# Patient Record
Sex: MALE | Race: WHITE | Employment: FULL TIME | URBAN - METROPOLITAN AREA
[De-identification: names, ages, dates, MRNs, and addresses within clinical notes are randomized per-mention and may not be internally consistent; named-entity substitution may affect disease eponyms.]

---

## 2017-07-13 PROCEDURE — 84425 ASSAY OF VITAMIN B-1: CPT | Performed by: FAMILY MEDICINE

## 2017-07-13 PROCEDURE — 82607 VITAMIN B-12: CPT | Performed by: FAMILY MEDICINE

## 2017-07-13 PROCEDURE — 83921 ORGANIC ACID SINGLE QUANT: CPT | Performed by: FAMILY MEDICINE

## 2017-07-13 PROCEDURE — 82746 ASSAY OF FOLIC ACID SERUM: CPT | Performed by: FAMILY MEDICINE

## 2017-07-13 PROCEDURE — 86618 LYME DISEASE ANTIBODY: CPT | Performed by: FAMILY MEDICINE

## 2017-07-13 PROCEDURE — 36415 COLL VENOUS BLD VENIPUNCTURE: CPT | Performed by: FAMILY MEDICINE

## 2019-01-16 PROBLEM — M25.511 CHRONIC RIGHT SHOULDER PAIN: Status: ACTIVE | Noted: 2019-01-16

## 2019-01-16 PROBLEM — G89.29 CHRONIC RIGHT SHOULDER PAIN: Status: ACTIVE | Noted: 2019-01-16

## 2019-01-16 PROBLEM — M54.12 RIGHT CERVICAL RADICULOPATHY: Status: ACTIVE | Noted: 2019-01-16

## 2019-09-25 PROBLEM — N48.6 PEYRONIE'S DISEASE: Status: ACTIVE | Noted: 2019-09-25

## 2019-09-25 PROBLEM — N40.1 ENLARGED PROSTATE WITH LOWER URINARY TRACT SYMPTOMS (LUTS): Status: ACTIVE | Noted: 2019-09-25

## 2021-07-19 PROBLEM — M25.551 RIGHT HIP PAIN: Status: ACTIVE | Noted: 2021-07-19

## 2021-12-27 ENCOUNTER — HOSPITAL ENCOUNTER (OUTPATIENT)
Dept: CT IMAGING | Facility: HOSPITAL | Age: 56
Discharge: HOME | End: 2021-12-27
Attending: FAMILY MEDICINE

## 2021-12-27 DIAGNOSIS — Z13.6 SCREENING FOR CARDIOVASCULAR CONDITION: ICD-10-CM

## 2022-02-07 ENCOUNTER — LAB ENCOUNTER (OUTPATIENT)
Dept: LAB | Facility: HOSPITAL | Age: 57
End: 2022-02-07
Attending: INTERNAL MEDICINE
Payer: COMMERCIAL

## 2022-02-07 DIAGNOSIS — Z20.822 ENCOUNTER FOR PREOPERATIVE SCREENING LABORATORY TESTING FOR COVID-19 VIRUS: ICD-10-CM

## 2022-02-07 DIAGNOSIS — Z01.812 ENCOUNTER FOR PREOPERATIVE SCREENING LABORATORY TESTING FOR COVID-19 VIRUS: ICD-10-CM

## 2022-02-07 LAB — SARS-COV-2 RNA RESP QL NAA+PROBE: NOT DETECTED

## 2022-02-07 NOTE — PROGRESS NOTES
You will be happy to know that your COVID 19 test returned NEGATIVE. If you need any further assistance, please feel free to call [phone]. Thank you for letting us care for you.     Best regards,   MD Victor Manuel Umanzor  Gastroenterology  [phone]

## 2022-02-28 ENCOUNTER — LAB ENCOUNTER (OUTPATIENT)
Dept: LAB | Facility: HOSPITAL | Age: 57
End: 2022-02-28
Attending: INTERNAL MEDICINE
Payer: COMMERCIAL

## 2022-02-28 DIAGNOSIS — Z20.822 ENCOUNTER FOR PREPROCEDURE SCREENING LABORATORY TESTING FOR COVID-19: ICD-10-CM

## 2022-02-28 DIAGNOSIS — Z01.812 ENCOUNTER FOR PREPROCEDURE SCREENING LABORATORY TESTING FOR COVID-19: ICD-10-CM

## 2022-03-01 LAB — SARS-COV-2 RNA RESP QL NAA+PROBE: NOT DETECTED

## 2022-03-01 NOTE — PROGRESS NOTES
You will be happy to know that your COVID 19 test returned NEGATIVE. If you need any further assistance, please feel free to call [phone]. Thank you for letting us care for you.     Best regards,   Vannesa Pratt MD  [facility]  Gastroenterology  [phone]

## 2022-03-03 ENCOUNTER — HOSPITAL ENCOUNTER (OUTPATIENT)
Facility: HOSPITAL | Age: 57
Setting detail: HOSPITAL OUTPATIENT SURGERY
Discharge: HOME | End: 2022-03-03
Attending: INTERNAL MEDICINE | Admitting: INTERNAL MEDICINE
Payer: COMMERCIAL

## 2022-03-03 VITALS
TEMPERATURE: 98 F | HEIGHT: 70 IN | RESPIRATION RATE: 18 BRPM | BODY MASS INDEX: 27.2 KG/M2 | DIASTOLIC BLOOD PRESSURE: 75 MMHG | HEART RATE: 73 BPM | SYSTOLIC BLOOD PRESSURE: 120 MMHG | OXYGEN SATURATION: 97 % | WEIGHT: 190 LBS

## 2022-03-03 DIAGNOSIS — Z20.822 ENCOUNTER FOR PREPROCEDURE SCREENING LABORATORY TESTING FOR COVID-19: Primary | ICD-10-CM

## 2022-03-03 DIAGNOSIS — R13.19 ESOPHAGEAL DYSPHAGIA: ICD-10-CM

## 2022-03-03 DIAGNOSIS — Z01.812 ENCOUNTER FOR PREPROCEDURE SCREENING LABORATORY TESTING FOR COVID-19: Primary | ICD-10-CM

## 2022-03-03 PROCEDURE — 4A1B7BZ MONITORING OF GASTROINTESTINAL PRESSURE, VIA NATURAL OR ARTIFICIAL OPENING: ICD-10-PCS | Performed by: INTERNAL MEDICINE

## 2025-03-25 ENCOUNTER — OFFICE VISIT (OUTPATIENT)
Dept: NEUROLOGY | Facility: CLINIC | Age: 60
End: 2025-03-25
Payer: COMMERCIAL

## 2025-03-25 VITALS
RESPIRATION RATE: 15 BRPM | WEIGHT: 204 LBS | SYSTOLIC BLOOD PRESSURE: 117 MMHG | DIASTOLIC BLOOD PRESSURE: 68 MMHG | HEART RATE: 73 BPM | BODY MASS INDEX: 29 KG/M2

## 2025-03-25 DIAGNOSIS — G37.9 DEMYELINATING CHANGES IN BRAIN (HCC): Primary | ICD-10-CM

## 2025-03-25 DIAGNOSIS — G56.01 CARPAL TUNNEL SYNDROME OF RIGHT WRIST: ICD-10-CM

## 2025-03-25 DIAGNOSIS — R29.898 WEAKNESS OF RIGHT LOWER EXTREMITY: ICD-10-CM

## 2025-03-25 RX ORDER — IBUPROFEN 400 MG/1
400 TABLET, FILM COATED ORAL DAILY
COMMUNITY

## 2025-03-25 RX ORDER — ASCORBIC ACID 500 MG
1000 TABLET ORAL DAILY
COMMUNITY

## 2025-03-25 RX ORDER — MECLIZINE HYDROCHLORIDE 25 MG/1
TABLET ORAL
COMMUNITY
Start: 2024-09-28

## 2025-03-25 RX ORDER — LORAZEPAM 1 MG/1
TABLET ORAL
Qty: 2 TABLET | Refills: 0 | Status: SHIPPED | OUTPATIENT
Start: 2025-03-25

## 2025-03-25 NOTE — H&P
Neurology H&P    Odell aCldwell Patient Status:  No patient class for patient encounter    1965 MRN [id number]   Location [facility], [address] [place] [address] Attending No att. providers found   Hosp Day # 0 PCP Sindi Samuel MD     Subjective:  Odell Caldwell is a(n) 60 year old male with a past medical history significant for BPH, chronic right shoulder pain, and a recent diagnosis of multiple sclerosis.  He is previously under the care of [facility] neurology.  He was diagnosed with also sclerosis at the end of last year by MRI findings.  He does not have any lumbar puncture.  He states that he has been having increasing numbness in the hand on the R. He lifts weights and feels that his R  has been getting weaker. He states that he is also having a difficulty time walking up stairs due to RLE weakness. He also states that he has numbness in the RLE which radiates down the RLE form the hip He saw Dr. Lopez in neurology and was given a diagnosis of MS. He states that he had a plan to start ocrevus and she did order multiple labs and immunizations. He also had an EMG and was diagnosed with R CTS and then had CTS release surgery. He states that he is not aware of ever having any \"MS attacks\". His biggest problems is a feeling of weakness int he RLE and weakness in the R deltoid. He has a h/o R shoulder surgery. He has no new urinary issues but does have BPH so this effects his stream a little bit. He denies any painful eye movements and no color vision changes. He has no FH of MS. is any radiating neck pains.  He denies any radiating low back pains.  He denies any fatigable weakness.  He states that he has not had any electrical sensations running up or down the spine suggestive of Lhermitte sign and does not notice any worsening of his symptoms in hot conditions.  He does report that he is had numbness exam for couple years and was diagnosed carpal tunnel.  His symptoms do not  seem to be appreciably worsening over the past few years.    Current Medications:  Current Outpatient Medications   Medication Sig Dispense Refill    fluticasone propionate 50 MCG/ACT Nasal Suspension 2 sprays by Each Nare route daily. 48 g 3    benzonatate 200 MG Oral Cap Take 1 capsule (200 mg total) by mouth 3 (three) times daily as needed for cough. 90 capsule 1    benzonatate 200 MG Oral Cap Take 1 capsule (200 mg total) by mouth 3 (three) times daily as needed for cough. 30 capsule 0    montelukast 10 MG Oral Tab Take 1 tablet (10 mg total) by mouth nightly. 90 tablet 3    Tadalafil 10 MG Oral Tab Take 1 tablet (10 mg total) by mouth daily as needed for Erectile Dysfunction. 30 tablet 5    Tadalafil 10 MG Oral Tab Take 1 tablet (10 mg total) by mouth daily as needed for Erectile Dysfunction. 30 tablet 3    tadalafil 5 MG Oral Tab Take 1 tablet (5 mg total) by mouth daily as needed for Erectile Dysfunction. 30 tablet 5    Alfuzosin HCl ER 10 MG Oral Tablet 24 Hr Take 1 tablet (10 mg total) by mouth daily. 90 tablet 3    Meloxicam 15 MG Oral Tab Take 1 tablet (15 mg total) by mouth daily. 30 tablet 1    tadalafil 5 MG Oral Tab Take 1 tablet (5 mg total) by mouth daily as needed for Erectile Dysfunction. 30 tablet 0    Pantoprazole Sodium 40 MG Oral Tab EC Take 1 tablet (40 mg total) by mouth daily. Before meal 90 tablet 5    Melatonin 10 MG Oral Cap Take 1 capsule by mouth nightly.      loratadine 10 MG Oral Tab       Multiple Vitamins-Minerals (MULTI VITAMIN/MINERALS) Oral Tab Take by mouth.         Problem List:  Patient Active Problem List   Diagnosis    BPH (benign prostatic hyperplasia)    Chronic right shoulder pain    Right cervical radiculopathy    Enlarged prostate with lower urinary tract symptoms (LUTS)    Peyronie's disease    Right hip pain       PMHx:  Past Medical History:    BPH    OTHER DISEASES    Gall bladder removed    Viral hepatitis C    tested pos but retested \"not likely\"        PSHx:  Past Surgical History:   Procedure Laterality Date    Colonoscopy      Colonoscopy,biopsy N/A 2016    Procedure: COLONOSCOPY, POSSIBLE BIOPSY, POSSIBLE POLYPECTOMY 99541;  Surgeon: ePrcy Flores MD;  Location: [facility]    Colonoscopy,remv lesn,snare N/A 2016    Procedure: COLONOSCOPY, POSSIBLE BIOPSY, POSSIBLE POLYPECTOMY 92175;  Surgeon: Percy Flores MD;  Location: [facility]    Knee surgery  1990    torn ACL.  never repaired    Laparoscopic cholecystectomy  2014    Procedure: LAPAROSCOPIC CHOLECYSTECTOMY;  Surgeon: Julio Cesar Rayo MD;  Location:  MAIN OR    Oral surgery procedure      Fithian Teeth Extraction-local    Other surgical history  2020    Cystoscopy (Dr. Leary)       SocHx:  Social History     Socioeconomic History    Marital status:    Tobacco Use    Smoking status: Never    Smokeless tobacco: Never   Substance and Sexual Activity    Alcohol use: Yes     Alcohol/week: 0.0 standard drinks of alcohol     Types: 2 Cans of beer, 2 Shots of liquor per week     Comment: Rarely    Drug use: No       Family History:  Family History   Problem Relation Age of Onset    Cancer Father         myeloma    Cancer Mother         Renal Cancer    Circulatory Problems Mother         Buerger's disease    Kidney Disease Mother         Kidney removed to renal cancer    Diabetes Paternal Grandmother          just after childbirth of my father           ROS:  10 point ROS completed and was negative, except for pertinent positive and negatives stated in subjective.    Objective/Physical Exam:    Vital Signs:  There were no vitals taken for this visit.    Gen: Awake and in no apparent distress  HEENT: moist mucus membranes  Neck: Supple  Cardiovascular: Regular rate and rhythm, no murmur  Pulm: CTAB  GI: non-tender, normal bowel sounds  Skin: normal, dry  Extremities: No clubbing or cyanosis      Neurologic:   MENTAL STATUS: alert, ox3, normal attention,  language and fund of knowledge.      CRANIAL NERVES II to XII: PERRLA, no ptosis or diplopia, EOM intact, facial sensation intact, strong eye closure, face is symmetric, no dysarthria, tongue midline,  no tongue fasciculations or atrophy, strong shoulder shrug.    MOTOR EXAMINATION:  MSK:  RUE: Delt:  5/5, Bi: 5/5, Tri: 5/5, : 5/5, interossei: 5/5, APB: 4/5, FE: 5/5  LUE: Delt:  5/5, Bi: 5/5, Tri: 5/5, : 5/5, interossei: 5/5, APB: 5/5, FE: 5/5  RLE: HF: 4+/5, KE: 5/5, KF: 5/5, DF: 5/5, PF: 5/5, Hip Adduction: 5/5, Hip Abduction: 5/5  LLE:  HF: 5/5, KE: 5/5, KF: 5/5, DF: 5/5, PF: 5/5, Hip Adduction: 5/5, Hip Abduction: 5/5  Normal Tone  No Fasiculations        SENSORY EXAMINATION:  UE: intact to light touch, pinprick intact  LE: intact to light touch, pinprick intact    COORDINATION:  No dysmetria, or intention tremors     REFLEXES: 2+ at biceps, 2+ brachioradialis, 2+ at patella, 2+ at the ankles.     GAIT: normal stance, normal toe gait and heel gait, tandem is mildly unsteady    Labs:  VZV: 6.4  JCV: 1.6 POSITIVE  Ig  IgM: 81  Quant-TB: neg  HBV: <5 non-reactive           Imaging:  MRI T spine ww/o 24  IMPRESSION:     Several tiny foci of T2 hyperintensity in the thoracic spinal cord as described. At least the two   foci at the T12 level are favored to represent demyelinating lesions over artifact. No enhancement   to suggest active demyelination.     MRI C spine ww/o 11/15/24  IMPRESSION:   1. New and progressed cord signal abnormality morphologically suspicious for demyelinating process   such as multiple sclerosis particularly given the presence of morphologically suspicious   intracranial lesions. No enhancing lesions are seen. Disease burden is mild-to-moderate. Clinical   correlation is recommended.   2.  Degenerative cervical spondylosis including C6-7 moderate-to-severe right foraminal narrowing.     MRI Brain ww/o 11/15/24  IMPRESSION:   1. Multiple predominantly supratentorial foci of  T2/FLAIR hyperintensity which are morphologically   suspicious for demyelinating process such as multiple sclerosis, particularly given the presence of   lesions within the cervical spinal cord. No enhancing lesions to suggest active demyelination.   Disease burden is mild-to-moderate. Clinical correlation is recommended.   2.  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     EMG/NCS 8/2024  Findings:  Upper extremities were warmed to greater than 33 C with hydrocolloid packs  The right median sensory nerve conduction studies revealed prolonged latencies and small amplitudes. The median motor study revealed normal latency with slightly small proximal but normal distal amplitudes and slow motor conduction velocities through the forearm.  The right ulnar and radial sensory nerve conduction studies revelaed small amplitudes but normal latencies.  The right ulnar motor nerve conduction studies were normal.  The needle EMG examination was performed in selected muscles of the right C5-T1 myotomes. All muscles tested, including the cervical paraspinals, revealed no abnormal spontaneous activity, normal motor unit morphology, and normal recruitment patterns.     Conclusion:    This is an abnormal study.   There is electrodiagnostic evidence of a mild right median mononeuropathy at the wrist (carpal tunnel syndrome.)  There is small amplitudes of all sensory nerve but normal motor nerves--this can be seen in a peripheral sensory axonal neuropathy.     Assessment:  This is a 60-year-old male with a recent diagnosis of multiple sclerosis.  He has had recent MRIs of the brain cervical and thoracic spine.  The reports of these imaging studies are noted above.  I personally reviewed the studies as well.  He [facility] neurologist with [facility] and the plan per chart review is to start Ocrevus. I did review his imaging. He does have a  few very small T2 hyperintense changes in the lower thoracic spine around T11 or 12, in the cervical spine around C3 and C5 though these are much less conspicuous than thoracic lesions, and a few scattered lesions in the cerebrum as well.  No infratentorial lesions noted.  I like to do lumbar puncture to evaluate for multiple sclerosis.  I will also order MOG  in addition to oligoclonal band profile.  I also like to repeat his EMG.  His main complaints are of the right hand numbness which she has had for quite some time this could still potentially be due to carpal tunnel, as well as his right lateral thigh numbness.  I favor meralgia paresthetica for the right thigh.  This would not initially explain the weakness but he does say he has some low-grade chronic low back pain.  Thus we will get an MRI of the L-spine as well.     Plan:  Right lower extremity hip flexor weakness  - EMG/NCS RUE and RLE  - MRI L spine       2. ? Demylenating lesions on MRI brain and T spine  - MRI brain, C and T spine reviewed  - [facility] neurology records reviewed  - LP  - Can continue plan for ocrevus if needed     A total of 61 minutes was spent with patient >50% of visit was spent in counseling and coordination of care, including discussion of diagnostic workup to date, discussion of medication side effects and plan for additional testing and monitoring as noted above; patient and family allowed to ask questions and all questions answered to the best of my ability.      Aston Leigh, DO  Neurology

## 2025-04-04 ENCOUNTER — NURSE ONLY (OUTPATIENT)
Dept: LAB | Facility: HOSPITAL | Age: 60
End: 2025-04-04
Attending: Other
Payer: COMMERCIAL

## 2025-04-04 ENCOUNTER — HOSPITAL ENCOUNTER (OUTPATIENT)
Dept: GENERAL RADIOLOGY | Facility: HOSPITAL | Age: 60
Discharge: HOME | End: 2025-04-04
Attending: Other
Payer: COMMERCIAL

## 2025-04-04 VITALS
HEART RATE: 62 BPM | DIASTOLIC BLOOD PRESSURE: 78 MMHG | BODY MASS INDEX: 30.31 KG/M2 | SYSTOLIC BLOOD PRESSURE: 114 MMHG | RESPIRATION RATE: 14 BRPM | OXYGEN SATURATION: 98 % | WEIGHT: 200 LBS | TEMPERATURE: 98 F | HEIGHT: 68 IN

## 2025-04-04 DIAGNOSIS — G37.9 DEMYELINATING CHANGES IN BRAIN (HCC): Primary | ICD-10-CM

## 2025-04-04 DIAGNOSIS — G37.9 DEMYELINATING CHANGES IN BRAIN (HCC): ICD-10-CM

## 2025-04-04 LAB
CLARITY CSF: CLEAR
COLOR CSF: COLORLESS
COUNT PERFORMED ON TUBE: 4
ERYTHROCYTE [DISTWIDTH] IN BLOOD BY AUTOMATED COUNT: 12.8 %
GLUCOSE CSF-MCNC: 63 MG/DL (ref 40–70)
HCT VFR BLD AUTO: 41.1 %
HGB BLD-MCNC: 14.5 G/DL
INR BLD: 1.13 (ref 0.8–1.2)
MCH RBC QN AUTO: 29.5 PG (ref 26–34)
MCHC RBC AUTO-ENTMCNC: 35.3 G/DL (ref 31–37)
MCV RBC AUTO: 83.5 FL
PLATELET # BLD AUTO: 210 10(3)UL (ref 150–450)
PROT PATTERN CSF ELPH-IMP: 97.8 MG/DL (ref 15–45)
PROTHROMBIN TIME: 14.6 SECONDS (ref 11.6–14.8)
RBC # BLD AUTO: 4.92 X10(6)UL
RBC # CSF: 1 /MM3 (ref ?–1)
TOTAL CELLS COUNTED CSF: 0 /MM3 (ref 0–5)
TOTAL VOLUME CSF: 14 ML
WBC # BLD AUTO: 5.7 X10(3) UL (ref 4–11)

## 2025-04-04 PROCEDURE — 82042 OTHER SOURCE ALBUMIN QUAN EA: CPT

## 2025-04-04 PROCEDURE — 82784 ASSAY IGA/IGD/IGG/IGM EACH: CPT

## 2025-04-04 PROCEDURE — 82040 ASSAY OF SERUM ALBUMIN: CPT

## 2025-04-04 PROCEDURE — 85610 PROTHROMBIN TIME: CPT

## 2025-04-04 PROCEDURE — 89050 BODY FLUID CELL COUNT: CPT

## 2025-04-04 PROCEDURE — 84157 ASSAY OF PROTEIN OTHER: CPT

## 2025-04-04 PROCEDURE — 36415 COLL VENOUS BLD VENIPUNCTURE: CPT

## 2025-04-04 PROCEDURE — 82164 ANGIOTENSIN I ENZYME TEST: CPT

## 2025-04-04 PROCEDURE — 86051 AQUAPORIN-4 ANTB ELISA: CPT

## 2025-04-04 PROCEDURE — 62328 DX LMBR SPI PNXR W/FLUOR/CT: CPT | Performed by: OTHER

## 2025-04-04 PROCEDURE — 86362 MOG-IGG1 ANTB CBA EACH: CPT

## 2025-04-04 PROCEDURE — 85027 COMPLETE CBC AUTOMATED: CPT

## 2025-04-04 PROCEDURE — 83916 OLIGOCLONAL BANDS: CPT

## 2025-04-04 PROCEDURE — 82945 GLUCOSE OTHER FLUID: CPT

## 2025-04-04 RX ORDER — ONDANSETRON 2 MG/ML
4 INJECTION INTRAMUSCULAR; INTRAVENOUS ONCE AS NEEDED
Status: ACTIVE | OUTPATIENT
Start: 2025-04-04 | End: 2025-04-04

## 2025-04-04 RX ORDER — SODIUM CHLORIDE 9 MG/ML
INJECTION, SOLUTION INTRAVENOUS CONTINUOUS
Status: DISCONTINUED | OUTPATIENT
Start: 2025-04-04 | End: 2025-04-06

## 2025-04-04 NOTE — DISCHARGE INSTRUCTIONS
Discharge/After Visit [facility] - Department of Radiology  Lumbar Puncture    Activity  After a Lumbar Puncture/Myelogram: Remain flat in bed until the morning after the procedure. You may get up to walk to the bathroom or sit up for brief periods to eat and safely swallow. Do not do any strenuous exercises or lift over 5 lbs. for 48 hours after the procedure.      After a Cervical Myelogram: Keep your head elevated 30° until the morning after the procedure - whether in a bed or a recliner.  You may get up to walk to the bathroom or sit up for brief periods to eat and safely swallow.  Do not do any strenuous exercises or lift over 5 lbs. for the next 48 hours.      Site Care  Keep a bandage on the puncture site for 24 hours after the procedure.  You may shower after 24 hours, but no soaking in a bathtub for 7 days.    Diet  Unless you are on a fluid restriction due to a medical condition, drink lots of fluid to prevent a headache after these procedures.  Resume your usual diet. A slow return to normal foods is an ideal way to minimize nausea.    Pain Management  You may develop a headache that will typically resolve on its own in 1 to 2 days. Lying down should help relieve this pain. You may use usual over-the-counter or prescription pain medications, as needed, if it is not contraindicated due to a medical condition.    Medications  You may resume your usual home medications. If you take blood thinners, you may resume them the day after your procedure. DO NOT take aspirin, Motrin, ibuprofen, or any medications containing NSAIDS (non-steroidal anti-inflammatory drugs) for 24 hours.    When to Seek Medical Advice  Call the provider that ordered this procedure with any questions and to discuss test results. Results may also be available in My Chart. You may also contact the Radiology Nurse at [phone] Monday-Friday 8-5 with any additional questions or concerns.    Dial [phone] and ask the   to page the on-call Interventional Radiologist if any of these occur:  Experience a severe headache or severe pain.  There is a change in color or temperature of the area where the procedure was performed.  You develop increasing pain or shortness of breath.  Unusual drowsiness, weakness, or dizziness.  Unusual vomiting.   IF YOU FEEL YOU ARE EXPERIENCING AN EMERGENCY,   CALL 911 OR GO TO THE NEAREST EMERGENCY ROOM  4.2.24 MO/  Radiology

## 2025-04-07 ENCOUNTER — HOSPITAL ENCOUNTER (EMERGENCY)
Facility: HOSPITAL | Age: 60
Discharge: HOME | End: 2025-04-07
Attending: EMERGENCY MEDICINE
Payer: COMMERCIAL

## 2025-04-07 ENCOUNTER — PATIENT MESSAGE (OUTPATIENT)
Dept: NEUROLOGY | Facility: CLINIC | Age: 60
End: 2025-04-07

## 2025-04-07 VITALS
BODY MASS INDEX: 29.55 KG/M2 | SYSTOLIC BLOOD PRESSURE: 126 MMHG | DIASTOLIC BLOOD PRESSURE: 75 MMHG | HEIGHT: 68 IN | RESPIRATION RATE: 23 BRPM | HEART RATE: 65 BPM | WEIGHT: 195 LBS | OXYGEN SATURATION: 100 % | TEMPERATURE: 99 F

## 2025-04-07 DIAGNOSIS — G97.1 POST LUMBAR PUNCTURE HEADACHE: Primary | ICD-10-CM

## 2025-04-07 LAB
ACE, CSF: 2.2 U/L
ANION GAP SERPL CALC-SCNC: 10 MMOL/L (ref 0–18)
APTT PPP: 29.4 SECONDS (ref 23–36)
BASOPHILS # BLD AUTO: 0.05 X10(3) UL (ref 0–0.2)
BASOPHILS NFR BLD AUTO: 0.8 %
BUN BLD-MCNC: 18 MG/DL (ref 9–23)
CALCIUM BLD-MCNC: 9.7 MG/DL (ref 8.7–10.6)
CHLORIDE SERPL-SCNC: 105 MMOL/L (ref 98–112)
CO2 SERPL-SCNC: 25 MMOL/L (ref 21–32)
CREAT BLD-MCNC: 0.72 MG/DL
EGFRCR SERPLBLD CKD-EPI 2021: 105 ML/MIN/1.73M2 (ref 60–?)
EOSINOPHIL # BLD AUTO: 0.14 X10(3) UL (ref 0–0.7)
EOSINOPHIL NFR BLD AUTO: 2.3 %
ERYTHROCYTE [DISTWIDTH] IN BLOOD BY AUTOMATED COUNT: 12.7 %
GLUCOSE BLD-MCNC: 105 MG/DL (ref 70–99)
HCT VFR BLD AUTO: 42.3 %
HGB BLD-MCNC: 15 G/DL
IMM GRANULOCYTES # BLD AUTO: 0.04 X10(3) UL (ref 0–1)
IMM GRANULOCYTES NFR BLD: 0.7 %
INR BLD: 1.06 (ref 0.8–1.2)
LYMPHOCYTES # BLD AUTO: 1.6 X10(3) UL (ref 1–4)
LYMPHOCYTES NFR BLD AUTO: 26.7 %
MCH RBC QN AUTO: 29.4 PG (ref 26–34)
MCHC RBC AUTO-ENTMCNC: 35.5 G/DL (ref 31–37)
MCV RBC AUTO: 82.8 FL
MONOCYTES # BLD AUTO: 0.32 X10(3) UL (ref 0.1–1)
MONOCYTES NFR BLD AUTO: 5.3 %
NEUTROPHILS # BLD AUTO: 3.85 X10 (3) UL (ref 1.5–7.7)
NEUTROPHILS # BLD AUTO: 3.85 X10(3) UL (ref 1.5–7.7)
NEUTROPHILS NFR BLD AUTO: 64.2 %
OSMOLALITY SERPL CALC.SUM OF ELEC: 292 MOSM/KG (ref 275–295)
PLATELET # BLD AUTO: 232 10(3)UL (ref 150–450)
POTASSIUM SERPL-SCNC: 3.8 MMOL/L (ref 3.5–5.1)
PROTHROMBIN TIME: 13.9 SECONDS (ref 11.6–14.8)
RBC # BLD AUTO: 5.11 X10(6)UL
SODIUM SERPL-SCNC: 140 MMOL/L (ref 136–145)
WBC # BLD AUTO: 6 X10(3) UL (ref 4–11)

## 2025-04-07 PROCEDURE — 99284 EMERGENCY DEPT VISIT MOD MDM: CPT

## 2025-04-07 PROCEDURE — 85610 PROTHROMBIN TIME: CPT | Performed by: EMERGENCY MEDICINE

## 2025-04-07 PROCEDURE — 99285 EMERGENCY DEPT VISIT HI MDM: CPT

## 2025-04-07 PROCEDURE — 85025 COMPLETE CBC W/AUTO DIFF WBC: CPT | Performed by: EMERGENCY MEDICINE

## 2025-04-07 PROCEDURE — 96374 THER/PROPH/DIAG INJ IV PUSH: CPT

## 2025-04-07 PROCEDURE — 85730 THROMBOPLASTIN TIME PARTIAL: CPT | Performed by: EMERGENCY MEDICINE

## 2025-04-07 PROCEDURE — 80048 BASIC METABOLIC PNL TOTAL CA: CPT | Performed by: EMERGENCY MEDICINE

## 2025-04-07 PROCEDURE — 96361 HYDRATE IV INFUSION ADD-ON: CPT

## 2025-04-07 RX ORDER — CAFFEINE 200 MG
200 TABLET ORAL ONCE
Status: COMPLETED | OUTPATIENT
Start: 2025-04-07 | End: 2025-04-07

## 2025-04-07 RX ORDER — KETOROLAC TROMETHAMINE 15 MG/ML
15 INJECTION, SOLUTION INTRAMUSCULAR; INTRAVENOUS ONCE
Status: COMPLETED | OUTPATIENT
Start: 2025-04-07 | End: 2025-04-07

## 2025-04-07 RX ORDER — ACETAMINOPHEN 500 MG
1000 TABLET ORAL ONCE
Status: COMPLETED | OUTPATIENT
Start: 2025-04-07 | End: 2025-04-07

## 2025-04-07 NOTE — ED QUICK NOTES
Patient walked to bathroom and around ER,patient sated \"fell little off balance\"  did not tolerate as well, doctor updated,

## 2025-04-07 NOTE — ED PROVIDER NOTES
Patient Seen in: [facility] Emergency Department      History     Chief Complaint   Patient presents with    Postop/Procedure Problem     Stated Complaint: lumbar puncure on friday - headache ongoing    Subjective:   HPI      Patient had a lumbar puncture on Friday, 3 days ago.  He felt fine afterwards, lay down most of the day.  Started have a headache on Saturday.  Back his head, no nausea no vomiting.  Took some Tylenol and lay down and noted that laying back the headache would almost resolved.  No neurological symptoms associated with this headache.      On Sunday, almost 40 hours after procedure he felt like the headache was resolved he felt felt well most the afternoon but the headache returned in the evening.  Headache persisted this morning she wanted to come in for evaluation to make sure he did not have any serious complications.        Objective:     Past Medical History:    BPH    BPH (benign prostatic hyperplasia)    Calculus of kidney    Esophageal reflux    Hx of motion sickness    Multiple sclerosis (HCC)    Muscle weakness    secomdary to MS- BLE    OTHER DISEASES    Gall bladder removed    Viral hepatitis C    tested pos but retested \"not likely\"    Visual impairment              Past Surgical History:   Procedure Laterality Date    Colonoscopy      Colonoscopy,biopsy N/A 1/12/2016    Procedure: COLONOSCOPY, POSSIBLE BIOPSY, POSSIBLE POLYPECTOMY 69108;  Surgeon: Percy Flores MD;  Location: [facility]    Colonoscopy,remv lesn,snare N/A 1/12/2016    Procedure: COLONOSCOPY, POSSIBLE BIOPSY, POSSIBLE POLYPECTOMY 12343;  Surgeon: Percy Flores MD;  Location: [facility]    Knee surgery  1990    torn ACL.  never repaired    Laparoscopic cholecystectomy  4/17/2014    Procedure: LAPAROSCOPIC CHOLECYSTECTOMY;  Surgeon: Julio Cesar Rayo MD;  Location:  MAIN OR    Oral surgery procedure      [place]ath Teeth Extraction-local    Other surgical history  12/09/2020    Cystoscopy (  [facility])                Social History     Socioeconomic History    Marital status:    Tobacco Use    Smoking status: Never    Smokeless tobacco: Never   Substance and Sexual Activity    Alcohol use: Not Currently     Comment: Rarely    Drug use: No   Other Topics Concern    Exercise Yes     Comment: daily 45 min    Seat Belt No    Special Diet No    Stress Concern No    Weight Concern No    Caffeine Concern Yes                  Physical Exam     ED Triage Vitals   BP 04/07/25 0542 128/87   Pulse 04/07/25 0542 77   Resp 04/07/25 0542 18   Temp 04/07/25 0545 98.7 °F (37.1 °C)   Temp src 04/07/25 0545 Temporal   SpO2 04/07/25 0542 99 %   O2 Device 04/07/25 0542 None (Room air)       Current Vitals:   Vital Signs  BP: 126/75  Pulse: 65  Resp: 23  Temp: 98.7 °F (37.1 °C)  Temp src: Temporal  MAP (mmHg): 90    Oxygen Therapy  SpO2: 100 %  O2 Device: None (Room air)        Physical Exam  Constitutional:  Appears well-developed and well-nourished.   Head: Normocephalic and atraumatic.   Nose: Nose normal.   Eyes: EOM are normal. Pupils are equal, round, and reactive to light.   Neck: Normal range of motion. Neck supple. No JVD present.   Cardiovascular: Normal rate and regular rhythm.    Pulmonary/Chest: Effort normal and breath sounds normal. No stridor.   Abdominal: Soft. There is no tenderness. There is no guarding.   Musculoskeletal: Exhibits no edema or tenderness.   Neurological: Pt is alert and oriented to person, place, and time.     There is no nystagmus.  There are no cranial nerve deficits.  Speech is fluent and not slurred.  There is no word finding difficulty.     Strength is 5 out of 5 in the upper extremities bilaterally.  Sensation is symmetric in the upper extremities and not diminished.    Normal strength and sensation bilateral lower extremities.      no cerebellar   no meningeal signs    Skin: Skin is warm and dry.   Psychiatric: Normal mood and affect. Thought content normal.       ED Course      Labs Reviewed   BASIC METABOLIC PANEL (8) - Abnormal; Notable for the following components:       Result Value    Glucose 105 (*)     All other components within normal limits   PROTHROMBIN TIME (PT) - Normal   PTT, ACTIVATED - Normal   CBC WITH DIFFERENTIAL WITH PLATELET            Outside records reviewed.  Patient had abnormal MRI concerning, possibly, for MS, he saw [facility] and a lumbar puncture was advised.  CSF showed normal cell counts but elevated protein.      Medications   sodium chloride 0.9 % IV bolus 1,000 mL (0 mL Intravenous Stopped 4/7/25 0725)   ketorolac (Toradol) 15 MG/ML injection 15 mg (15 mg Intravenous Given 4/7/25 0625)   caffeine tab 200 mg (200 mg Oral Given 4/7/25 0624)   acetaminophen (Tylenol Extra Strength) tab 1,000 mg (1,000 mg Oral Given 4/7/25 0624)            MDM          Differential diagnoses considered: Likely post LP headache given recent procedure and positional component.  Doubt tension headache or migraine headache.      Patient was seen by anesthesia service-and ultimately blood patch was deferred.  Please see anesthesia note for further details.    -Agree, since he has deferred blood patch, with Tylenol, NSAIDs, good hydration.  -Outpatient urology follow-up, return for intractable symptoms.      *Discussion of ongoing management of this patient's care included: Anesthesia service  *Comorbidities contributing to the complexity of decision making: MS, recent lumbar puncture  *External charts reviewed:  as noted above  *Additional sources of history: n/a    Shared decision making was done by: patient, myself.          Medical Decision Making      Disposition and Plan     Clinical Impression:  1. Post lumbar puncture headache         Disposition:  Discharge  4/7/2025  9:32 am    Follow-up:  [facility] Emergency Department  [address] [place] [id number]  [phone]  Follow up  As needed, If symptoms worsen          Medications  Prescribed:  Discharge Medication List as of 4/7/2025  9:44 AM              Supplementary Documentation:

## 2025-04-07 NOTE — PROGRESS NOTES
Patient: Odell Caldwell  Evaluation for post-dural puncture headache (PDPH)  Procedure Summary       Date: 04/07/25 Room / Location:     Anesthesia Start:  Anesthesia Stop:     Procedure: EPIDURAL BLOOD PATCH Diagnosis:     Scheduled Providers:  Anesthesiologist:     Anesthesia Type: Not recorded ASA Status: Not recorded              Vitals Value Taken Time   / 91 04/07/25 0943   Temp  04/07/25 0943   Pulse 64 04/07/25 0942   Resp 13 04/07/25 0942   SpO2 99 % 04/07/25 0942   Vitals shown include unfiled device data.    Asked to evaluate Rich in the ER for the need for epidural blood patch for symptoms that were consistent with PDPH. Patient reported that he had a diagnostic lumbar puncture last Friday, ordered by his neurologist, Dr. Reddy as part of his multiple sclerosis work-up. He first noticed headache pain that same day, in his occiput and frontal head regions. Pain was exacerbated and at its worse when standing upright to use the bathroom, and completely resolved when lying supine. He said that his headache pain resolved yesterday morning, but then returned last night and has persisted until his arrival to the ER today. He thinks he may have \"over did it\" with activity yesterday once noticing the headache went away. He reported, that despite the return of the same headache, he feels his symptoms are tolerable and would prefer not having another needle in his back. He presented to the ER this morning because his procedure discharge report instructed him to return to the ER if headache symptoms did not resolve within 48 hours. He denied blurred vision, diplopia, nausea, vomiting, an inability to eat and drink. He did report slight dizziness feeling when standing upright. Also, reported that he has baseline hand numbness and leg weakness related to his MS. He appeared comfortable lying supine in bed. After discussing the options of performing the EBP or watchful waiting extensively with him, he  decided to continue conservative measures including bedrest, fluid hydration and NSAID's/ Tylenol PRN and wait and see if the headache spontaneously resolves on its own, again over the next two days. I reassured him that most PDPH do resolve spontaneously within a week's time. He plans to notify Dr. Reddy' office about his headache and presentation to the ER. I advised him to return to the ER for further evaluation if his symptoms worsen.     Wade Vasquez MD  4/7/2025 9:43 AM

## 2025-04-07 NOTE — ED INITIAL ASSESSMENT (HPI)
Patient had LP Friday, patient report head started coming back tonight. Per patient no headache when supine. Patient in supine position.

## 2025-04-07 NOTE — ED QUICK NOTES
Patient walked to bathroom, steady gait, very little pain noted by patient. Tolerated Well. Doctor updated.

## 2025-04-08 LAB
ALBUMIN CSF: 74 MG/DL
ALBUMIN: 4.5 G/DL
CSF IGG INDEX: 0.5
CSF/SER ALB INDEX: 16
IGG CSF: 11.6 MG/DL
IGG SYNTHESIS  RATE CSF: 6.9 MG/DAY
IGG/ALB RATIO CSF: 0.16
IGG: 1291 MG/DL
MOG ANTIBODY, CELL-BASED IFA: NEGATIVE

## 2025-04-11 LAB — NMO IGG AUTOABS: <1.5 U/ML

## 2025-06-03 ENCOUNTER — HOSPITAL ENCOUNTER (OUTPATIENT)
Dept: MRI IMAGING | Facility: HOSPITAL | Age: 60
Discharge: HOME | End: 2025-06-03
Attending: Other
Payer: COMMERCIAL

## 2025-06-03 DIAGNOSIS — R29.898 WEAKNESS OF RIGHT LOWER EXTREMITY: ICD-10-CM

## 2025-06-03 PROCEDURE — 72148 MRI LUMBAR SPINE W/O DYE: CPT | Performed by: OTHER

## 2025-06-12 ENCOUNTER — PROCEDURE VISIT (OUTPATIENT)
Dept: NEUROLOGY | Facility: CLINIC | Age: 60
End: 2025-06-12
Payer: COMMERCIAL

## 2025-06-12 DIAGNOSIS — M54.12 RIGHT CERVICAL RADICULOPATHY: Primary | ICD-10-CM

## 2025-06-12 PROCEDURE — 95911 NRV CNDJ TEST 9-10 STUDIES: CPT | Performed by: OTHER

## 2025-06-12 PROCEDURE — 95886 MUSC TEST DONE W/N TEST COMP: CPT | Performed by: OTHER

## 2025-08-07 ENCOUNTER — OFFICE VISIT (OUTPATIENT)
Dept: NEUROLOGY | Facility: CLINIC | Age: 60
End: 2025-08-07

## 2025-08-07 VITALS
WEIGHT: 202 LBS | SYSTOLIC BLOOD PRESSURE: 121 MMHG | RESPIRATION RATE: 16 BRPM | DIASTOLIC BLOOD PRESSURE: 71 MMHG | HEART RATE: 76 BPM | BODY MASS INDEX: 31 KG/M2

## 2025-08-07 DIAGNOSIS — R29.898 WEAKNESS OF RIGHT LOWER EXTREMITY: ICD-10-CM

## 2025-08-07 DIAGNOSIS — G35 MS (MULTIPLE SCLEROSIS) (HCC): Primary | ICD-10-CM

## 2025-08-07 PROCEDURE — 3078F DIAST BP <80 MM HG: CPT | Performed by: OTHER

## 2025-08-07 PROCEDURE — 99214 OFFICE O/P EST MOD 30 MIN: CPT | Performed by: OTHER

## 2025-08-07 PROCEDURE — 3074F SYST BP LT 130 MM HG: CPT | Performed by: OTHER

## 2025-08-07 RX ORDER — VIBEGRON 75 MG/1
75 TABLET, FILM COATED ORAL DAILY
COMMUNITY
Start: 2025-07-30

## (undated) NOTE — LETTER
Kamilla Cho [address] [facility] [address], [address] [facility]  Authorization for Surgical Operation and Procedure   Date:___________                                                                                            Time:__________  1. I hereby Alison Desir MD, my physician and his/her assistants (if applicable), which may include medical students, residents, and/or fellows, to perform the following surgical operation/ procedure and administer such anesthesia as may be determined necessary by my physician:  Operation/Procedure name (s) [address]   2. I recognize that during the surgical operation/procedure, unforeseen conditions may necessitate additional or different procedures than those listed above. I, therefore, further authorize and request that the above-named surgeon, assistants, or designees perform such procedures as are, in their judgment, necessary and desirable. 3.   My surgeon/physician has discussed prior to my surgery the potential benefits, risks and side effects of this procedure; the likelihood of achieving goals; and potential problems that might occur during recuperation. They also discussed reasonable alternatives to the procedure, including risks, benefits, and side effects related to the alternatives and risks related to not receiving this procedure. I have had all my questions answered and I acknowledge that no guarantee has been made as to the result that may be obtained. 4.   Should the need arise during my operation or immediate post-operative period, I also consent to the administration of blood and/or blood products. Further, I understand that despite careful testing and screening of blood or blood products by collecting agencies, I may still be subject to ill effects as a result of receiving a blood transfusion and/or blood products.   The following are some, but not all, of the potential risks that can occur: fever and allergic reactions, hemolytic reactions, transmission of diseases such as Hepatitis, AIDS and Cytomegalovirus (CMV) and fluid overload. In the event that I wish to have an autologous transfusion of my own blood, or a directed donor transfusion. I will discuss this with my physician. 5.   I authorize the use of any specimen, organs, tissues, body parts or foreign objects that may be removed from my body during the operation/procedure for diagnosis, research or teaching purposes and their subsequent disposal by hospital authorities. I also authorize the release of specimen test results and/or written reports to my treating physician on the hospital medical staff or other referring or consulting physicians involved in my care, at the discretion of the Pathologist or my treating physician. 6.   I consent to the photographing or videotaping of the operations or procedures to be performed, including appropriate portions of my body for medical, scientific, or educational purposes, provided my identity is not revealed by the pictures or by descriptive texts accompanying them. If the procedure has been photographed/videotaped, the surgeon will obtain the original picture, image, videotape or CD. The hospital will not be responsible for storage, release or maintenance of the picture, image, tape or CD.    7.   I consent to the presence of a  or observers in the operating room as deemed necessary by my physician or their designees. 8.   I recognize that in the event my procedure results in extended X-Ray/fluoroscopy time, I may develop a skin reaction. 9. If I have a Do Not Attempt Resuscitation (DNAR) order in place, that status will be suspended while in the operating room, procedural suite, and during the recovery period unless otherwise explicitly stated by me (or a person authorized to consent on my behalf).  The surgeon or my attending physician will determine when the applicable recovery period ends for purposes of reinstating the DNAR order. 10. Patients having a sterilization procedure: I understand that if the procedure is successful the results will be permanent and it will therefore be impossible for me to inseminate, conceive, or bear children. I also understand that the procedure is intended to result in sterility, although the result has not been guaranteed. 11. I acknowledge that my physician has explained sedation/analgesia administration to me including the risk and benefits I consent to the administration of sedation/analgesia as may be necessary or desirable in the judgment of my physician.     I CERTIFY THAT I HAVE READ AND FULLY UNDERSTAND THE ABOVE CONSENT TO OPERATION and/or OTHER PROCEDURE.      _________________________________________  __________________________________  Signature of Patient     Signature of Responsible Person         ___________________________________         Printed Name of Responsible Person           _________________________________                 Relationship to Patient  _________________________________________  ______________________________  Signature of Witness          Date  Time          Patient Name: Tj Al     : 1965                 Printed: March 3, 2022     Medical Record #: [id number]                                            Page 1 of 1